# Patient Record
(demographics unavailable — no encounter records)

---

## 2025-05-02 NOTE — HISTORY OF PRESENT ILLNESS
[de-identified] : was taking consent for procedure, got dizzy, blurry and double vision. attending sat him down, floor nurse got him some juice an blood pressure done by floor nurse 102/63. also had the urge to move his bowels. had a BM and felt better after this. was sent to ER. was evaluated in  ER and told to inc hydration, likely dehydrated. had a poor appetite last night, had 2 bananas, had severe heart burn. needed ppi. this AM appetite still poor, was having a couple of bites of breakfast and had more heart burn.